# Patient Record
Sex: MALE | Race: WHITE | NOT HISPANIC OR LATINO | Employment: FULL TIME | URBAN - METROPOLITAN AREA
[De-identification: names, ages, dates, MRNs, and addresses within clinical notes are randomized per-mention and may not be internally consistent; named-entity substitution may affect disease eponyms.]

---

## 2017-02-07 ENCOUNTER — OFFICE VISIT - HEALTHEAST (OUTPATIENT)
Dept: FAMILY MEDICINE | Facility: CLINIC | Age: 27
End: 2017-02-07

## 2017-02-07 DIAGNOSIS — R21 RASH OF HANDS: ICD-10-CM

## 2017-02-07 DIAGNOSIS — L29.9 ITCHING OF EAR: ICD-10-CM

## 2017-08-15 ENCOUNTER — RECORDS - HEALTHEAST (OUTPATIENT)
Dept: LAB | Facility: HOSPITAL | Age: 27
End: 2017-08-15

## 2017-08-16 LAB — HBV SURFACE AB SERPL IA-ACNC: POSITIVE M[IU]/ML

## 2019-11-04 ENCOUNTER — COMMUNICATION - HEALTHEAST (OUTPATIENT)
Dept: SCHEDULING | Facility: CLINIC | Age: 29
End: 2019-11-04

## 2019-11-05 ENCOUNTER — COMMUNICATION - HEALTHEAST (OUTPATIENT)
Dept: SCHEDULING | Facility: CLINIC | Age: 29
End: 2019-11-05

## 2019-11-05 ENCOUNTER — OFFICE VISIT - HEALTHEAST (OUTPATIENT)
Dept: FAMILY MEDICINE | Facility: CLINIC | Age: 29
End: 2019-11-05

## 2019-11-05 DIAGNOSIS — R50.9 FEVER AND CHILLS: ICD-10-CM

## 2019-11-05 DIAGNOSIS — J02.9 SORE THROAT: ICD-10-CM

## 2019-11-05 DIAGNOSIS — J06.9 URI WITH COUGH AND CONGESTION: ICD-10-CM

## 2019-11-05 LAB — DEPRECATED S PYO AG THROAT QL EIA: NORMAL

## 2019-11-05 ASSESSMENT — MIFFLIN-ST. JEOR: SCORE: 1655.18

## 2019-11-06 LAB — GROUP A STREP BY PCR: NORMAL

## 2019-11-08 ENCOUNTER — OFFICE VISIT - HEALTHEAST (OUTPATIENT)
Dept: FAMILY MEDICINE | Facility: CLINIC | Age: 29
End: 2019-11-08

## 2019-11-08 DIAGNOSIS — R05.9 COUGH: ICD-10-CM

## 2019-11-08 DIAGNOSIS — R50.9 FEVER AND CHILLS: ICD-10-CM

## 2019-11-08 LAB
ALBUMIN SERPL-MCNC: 3.6 G/DL (ref 3.5–5)
ALBUMIN UR-MCNC: NEGATIVE MG/DL
ALP SERPL-CCNC: 101 U/L (ref 45–120)
ALT SERPL W P-5'-P-CCNC: 103 U/L (ref 0–45)
ANION GAP SERPL CALCULATED.3IONS-SCNC: 11 MMOL/L (ref 5–18)
APPEARANCE UR: CLEAR
AST SERPL W P-5'-P-CCNC: 83 U/L (ref 0–40)
BASOPHILS # BLD AUTO: 0 THOU/UL (ref 0–0.2)
BASOPHILS NFR BLD AUTO: 0 % (ref 0–2)
BILIRUB SERPL-MCNC: 0.6 MG/DL (ref 0–1)
BILIRUB UR QL STRIP: NEGATIVE
BUN SERPL-MCNC: 7 MG/DL (ref 8–22)
C REACTIVE PROTEIN LHE: 5.8 MG/DL (ref 0–0.8)
CALCIUM SERPL-MCNC: 8.7 MG/DL (ref 8.5–10.5)
CHLORIDE BLD-SCNC: 99 MMOL/L (ref 98–107)
CO2 SERPL-SCNC: 25 MMOL/L (ref 22–31)
COLOR UR AUTO: YELLOW
CREAT SERPL-MCNC: 0.86 MG/DL (ref 0.7–1.3)
EOSINOPHIL COUNT (ABSOLUTE): 0 THOU/UL (ref 0–0.4)
EOSINOPHIL NFR BLD AUTO: 1 % (ref 0–6)
ERYTHROCYTE [DISTWIDTH] IN BLOOD BY AUTOMATED COUNT: 11.9 % (ref 11–14.5)
ERYTHROCYTE [SEDIMENTATION RATE] IN BLOOD BY WESTERGREN METHOD: 12 MM/HR (ref 0–15)
GFR SERPL CREATININE-BSD FRML MDRD: >60 ML/MIN/1.73M2
GLUCOSE BLD-MCNC: 88 MG/DL (ref 70–125)
GLUCOSE UR STRIP-MCNC: NEGATIVE MG/DL
HCT VFR BLD AUTO: 38.6 % (ref 40–54)
HGB BLD-MCNC: 13.2 G/DL (ref 14–18)
HGB UR QL STRIP: NEGATIVE
KETONES UR STRIP-MCNC: NEGATIVE MG/DL
LEUKOCYTE ESTERASE UR QL STRIP: NEGATIVE
LYMPHOCYTES # BLD AUTO: 1.2 THOU/UL (ref 0.8–4.4)
LYMPHOCYTES NFR BLD AUTO: 25 % (ref 20–40)
MCH RBC QN AUTO: 28 PG (ref 27–34)
MCHC RBC AUTO-ENTMCNC: 34.2 G/DL (ref 32–36)
MCV RBC AUTO: 82 FL (ref 80–100)
MONOCYTES # BLD AUTO: 0.4 THOU/UL (ref 0–0.9)
MONOCYTES NFR BLD AUTO: 8 % (ref 2–10)
MONOCYTES NFR BLD AUTO: NEGATIVE %
NITRATE UR QL: NEGATIVE
OVALOCYTES: ABNORMAL
PH UR STRIP: 6 [PH] (ref 5–8)
PLAT MORPH BLD: NORMAL
PLATELET # BLD AUTO: 168 THOU/UL (ref 140–440)
PMV BLD AUTO: 12.3 FL (ref 8.5–12.5)
POTASSIUM BLD-SCNC: 4.1 MMOL/L (ref 3.5–5)
PROT SERPL-MCNC: 7 G/DL (ref 6–8)
RBC # BLD AUTO: 4.71 MILL/UL (ref 4.4–6.2)
REACTIVE LYMPHS: ABNORMAL
SODIUM SERPL-SCNC: 135 MMOL/L (ref 136–145)
SP GR UR STRIP: <=1.005 (ref 1–1.03)
TOTAL NEUTROPHILS-ABS(DIFF): 3.1 THOU/UL (ref 2–7.7)
TOTAL NEUTROPHILS-REL(DIFF): 66 % (ref 50–70)
UROBILINOGEN UR STRIP-ACNC: NORMAL
WBC: 4.7 THOU/UL (ref 4–11)

## 2019-11-08 ASSESSMENT — MIFFLIN-ST. JEOR: SCORE: 1648.1

## 2019-11-09 ENCOUNTER — COMMUNICATION - HEALTHEAST (OUTPATIENT)
Dept: SCHEDULING | Facility: CLINIC | Age: 29
End: 2019-11-09

## 2019-11-13 ENCOUNTER — OFFICE VISIT - HEALTHEAST (OUTPATIENT)
Dept: FAMILY MEDICINE | Facility: CLINIC | Age: 29
End: 2019-11-13

## 2019-11-13 DIAGNOSIS — J18.9 PNEUMONIA OF LEFT LOWER LOBE DUE TO INFECTIOUS ORGANISM: ICD-10-CM

## 2019-11-13 LAB
AEROBIC BLOOD CULTURE BOTTLE: NO GROWTH
ANAEROBIC BLOOD CULTURE BOTTLE: NO GROWTH

## 2019-11-13 ASSESSMENT — MIFFLIN-ST. JEOR: SCORE: 1658.87

## 2020-07-06 ENCOUNTER — OFFICE VISIT - HEALTHEAST (OUTPATIENT)
Dept: FAMILY MEDICINE | Facility: CLINIC | Age: 30
End: 2020-07-06

## 2020-07-06 DIAGNOSIS — Z13.228 SCREENING FOR ENDOCRINE, NUTRITIONAL, METABOLIC AND IMMUNITY DISORDER: ICD-10-CM

## 2020-07-06 DIAGNOSIS — Z13.0 SCREENING FOR ENDOCRINE, NUTRITIONAL, METABOLIC AND IMMUNITY DISORDER: ICD-10-CM

## 2020-07-06 DIAGNOSIS — Z13.29 SCREENING FOR ENDOCRINE, NUTRITIONAL, METABOLIC AND IMMUNITY DISORDER: ICD-10-CM

## 2020-07-06 DIAGNOSIS — Z00.00 VISIT FOR PREVENTIVE HEALTH EXAMINATION: ICD-10-CM

## 2020-07-06 DIAGNOSIS — Z13.21 SCREENING FOR ENDOCRINE, NUTRITIONAL, METABOLIC AND IMMUNITY DISORDER: ICD-10-CM

## 2020-07-06 LAB
ALBUMIN SERPL-MCNC: 4.1 G/DL (ref 3.5–5)
ALP SERPL-CCNC: 82 U/L (ref 45–120)
ALT SERPL W P-5'-P-CCNC: 20 U/L (ref 0–45)
ANION GAP SERPL CALCULATED.3IONS-SCNC: 12 MMOL/L (ref 5–18)
AST SERPL W P-5'-P-CCNC: 23 U/L (ref 0–40)
BILIRUB SERPL-MCNC: 0.5 MG/DL (ref 0–1)
BUN SERPL-MCNC: 18 MG/DL (ref 8–22)
CALCIUM SERPL-MCNC: 9.6 MG/DL (ref 8.5–10.5)
CHLORIDE BLD-SCNC: 107 MMOL/L (ref 98–107)
CHOLEST SERPL-MCNC: 192 MG/DL
CO2 SERPL-SCNC: 23 MMOL/L (ref 22–31)
CREAT SERPL-MCNC: 1.02 MG/DL (ref 0.7–1.3)
FASTING STATUS PATIENT QL REPORTED: NO
GFR SERPL CREATININE-BSD FRML MDRD: >60 ML/MIN/1.73M2
GLUCOSE BLD-MCNC: 90 MG/DL (ref 70–125)
HDLC SERPL-MCNC: 51 MG/DL
HIV 1+2 AB+HIV1 P24 AG SERPL QL IA: NEGATIVE
LDLC SERPL CALC-MCNC: 122 MG/DL
POTASSIUM BLD-SCNC: 4.9 MMOL/L (ref 3.5–5)
PROT SERPL-MCNC: 7.8 G/DL (ref 6–8)
SODIUM SERPL-SCNC: 142 MMOL/L (ref 136–145)
TRIGL SERPL-MCNC: 93 MG/DL
TSH SERPL DL<=0.005 MIU/L-ACNC: 1.77 UIU/ML (ref 0.3–5)

## 2020-07-06 ASSESSMENT — MIFFLIN-ST. JEOR: SCORE: 1670.21

## 2020-07-09 ENCOUNTER — COMMUNICATION - HEALTHEAST (OUTPATIENT)
Dept: FAMILY MEDICINE | Facility: CLINIC | Age: 30
End: 2020-07-09

## 2020-07-11 ENCOUNTER — COMMUNICATION - HEALTHEAST (OUTPATIENT)
Dept: FAMILY MEDICINE | Facility: CLINIC | Age: 30
End: 2020-07-11

## 2021-05-30 VITALS — BODY MASS INDEX: 27.35 KG/M2 | WEIGHT: 172 LBS

## 2021-06-03 VITALS
DIASTOLIC BLOOD PRESSURE: 66 MMHG | RESPIRATION RATE: 18 BRPM | HEART RATE: 80 BPM | HEIGHT: 67 IN | BODY MASS INDEX: 26.13 KG/M2 | OXYGEN SATURATION: 96 % | SYSTOLIC BLOOD PRESSURE: 108 MMHG | TEMPERATURE: 97.9 F | WEIGHT: 166.5 LBS

## 2021-06-03 VITALS
DIASTOLIC BLOOD PRESSURE: 56 MMHG | HEART RATE: 78 BPM | SYSTOLIC BLOOD PRESSURE: 104 MMHG | RESPIRATION RATE: 18 BRPM | HEIGHT: 67 IN | BODY MASS INDEX: 26.14 KG/M2 | TEMPERATURE: 98 F | OXYGEN SATURATION: 99 % | WEIGHT: 166.56 LBS

## 2021-06-03 VITALS
BODY MASS INDEX: 25.9 KG/M2 | RESPIRATION RATE: 16 BRPM | SYSTOLIC BLOOD PRESSURE: 102 MMHG | HEART RATE: 76 BPM | HEIGHT: 67 IN | DIASTOLIC BLOOD PRESSURE: 60 MMHG | WEIGHT: 165 LBS | TEMPERATURE: 98.2 F

## 2021-06-03 NOTE — PATIENT INSTRUCTIONS - HE
Upper respiratory infection with cough and congestion:    Rapid Strep A screen today was negative.    Continue symptomatic care measures with Tylenol as needed, fluids, and rest.    Follow up if fever is above 100 degrees.

## 2021-06-03 NOTE — PATIENT INSTRUCTIONS - HE
Pneumonia Right lower lobe: Finish doxycycline. A work note will be provided. Call back if fever persists.

## 2021-06-03 NOTE — TELEPHONE ENCOUNTER
RN Assessment/Reason for Call:   Okay to leave Detailed Message  Patient calling in, 11/8 lab results   liver enzymes AST 83  told to stop taking Tylenol ; was taking Ibuphrophen 2-200 mg.  2 weeks ago was ill with a fever.  C reactive protein 5.8  Suppose to work this weekend.  Wrote a note.    RN Action/Disposition:   on call for RLN Dr Coy paged ; called back; nothing concerning..  Call back if worse symptoms  Discussed home care measures.  Offered flu immunization when feeling better> 2 weeks..  Agrees to plan.     Nina Bell RN    [facility] Triage/med refill  11/9/2019  12:25 PM

## 2021-06-03 NOTE — PATIENT INSTRUCTIONS - HE
Fever:     Lab results are pending. Dr. Guevara will call with results.    UA unremarkable thus far.     Note given to be off of work to 11/12/2019.    Consider referral to Infectious Disease.     Continue ibuprofen and Tylenol for fever.    Rest and push fluids.     Cough:     Start Tessalon Perles.

## 2021-06-03 NOTE — PROGRESS NOTES
ASSESSMENT AND PLAN:  1. Fever and chills  Patient has episodic fever did do a rapid strep test today results are below  - Rapid Strep A Screen- Throat Swab  - Group A Strep, RNA Direct Detection, Throat    2. Sore throat    The strep test was negative.  He has a mild sore throat suggested gargles and symptomatic treatment  - Rapid Strep A Screen- Throat Swab  - Group A Strep, RNA Direct Detection, Throat    3. URI with cough and congestion    He has been ill for approximately 4 days he is a work note covering him from [employer] suggested he vigorously try to rest hydrate himself if he continues to have a temperature above 100 needs to be reevaluated          Orders Placed This Encounter   Procedures     Rapid Strep A Screen- Throat Swab     Group A Strep, RNA Direct Detection, Throat     Medications Discontinued During This Encounter   Medication Reason     acetic acid-hydrocortisone (VOSOL-HC) otic solution Therapy completed       No follow-ups on file.    CHIEF COMPLAINT:  Chief Complaint   Patient presents with     Fever       HISTORY OF PRESENT ILLNESS:  Paresh is a 29 y.o. male who presents to the clinic today for ED follow up. He was evaluated in [facility] ED last night for fever and cough that started three days ago. His fever reportedly went up to 104 degrees. There was also accompanying dry cough, nasal congestion, low back pain, and mild headache. At home, his wife has had the flu, and his 4-year-old son had pneumonia two weeks ago. Influenza screens were negative. Chest x-rays were clear. At this point, the patient is still having dry cough and nasal congestion. He last took ibuprofen around 11:30am this morning. His sore throat and low back pain have resolved, and Paresh has been able to eat and swallow fine. There is no pain with swallowing or chewing. He denies any known environmental allergies. The patient has not gone to work at all this past week, and has been trying to rest. There has been  "no burning abdominal pain or dysuria.     REVIEW OF SYSTEMS:   General: Some fatigue.   ENT: Nasal congestion. No sore throat, dysphagia, or pain with chewing.  Respiratory: Dry cough.   GI: No abdominal pain.   : No dysuria.  Musculoskeletal: No body aches.  All other systems are negative.    PFSH:  He is employed at the [employer]. He is  and has a 4-year-old son. Reviewed as below.     TOBACCO USE:  Social History     Tobacco Use   Smoking Status Former Smoker   Smokeless Tobacco Never Used       VITALS:  Vitals:    11/05/19 1641   BP: 104/56   Pulse: 78   Resp: 18   Temp: 98  F (36.7  C)   TempSrc: Oral   SpO2: 99%   Weight: 166 lb 9 oz (75.6 kg)   Height: 5' 6.75\" (1.695 m)     Wt Readings from Last 3 Encounters:   11/05/19 166 lb 9 oz (75.6 kg)   02/07/17 172 lb (78 kg)   02/24/15 157 lb (71.2 kg)     Body mass index is 26.28 kg/m .    PHYSICAL EXAM:  General: Alert, cooperative, no distress, appears stated age  Head: Normocephalic, without obvious abnormality, atraumatic  Eyes: PERRL, conjunctiva/cornea clear, EOM's intact  Ears: Normal TM's and external ear canals, both ears  Nose: Nares normal, septum midline, mucosa normal, no drainage or sinus tenderness  Throat: Lips, mucosa, and tongue normal; teeth and gums normal, posterior of pharyngeal wall is erythematous  Lungs: Clear to auscultation bilaterally, respirations unlabored  Heart: Regular rate and rhythm, S1 and S2 normal, no murmur, rub, or gallop  Neurologic:  A & O x 3.  No tremor, no focal findings. Normal gait.     DATA REVIEWED:  Additional History from Old Records Summarized (2): none  Decision to Obtain Records (1): Reviewed [facility] 11/04/2019 ED note regardinge evaluation for fever.  Radiology Tests Summarized or Ordered (1): 11/04/2019 chest x-rays were clear.   Labs Reviewed or Ordered (1): 11/04/2019 influenza A and B screens were negative. Rapid Strep A screen ordered and was negative.  Medicine Test " Summarized or Ordered (1): none  Independent Review of EKG or X-RAY(2 each): none    I, Gisselle Metcalf, am scribing for and in the presence of, Dr. Guevara.    I, Dr. Guevara, personally performed the services described in this documentation, as scribed by Gisselle Metcalf in my presence, and it is both accurate and complete.      MEDICATIONS:  No current outpatient medications on file.     No current facility-administered medications for this visit.        Total Data Points: 3    Please note that this clinical encounter uses voice recognition software, there may be typographical errors present

## 2021-06-03 NOTE — TELEPHONE ENCOUNTER
RN Triage:    Spoke with 29 yr old Paresh who c/o:    Fever developed 2 evenings ago.    Yesterday fever was up to 104.0.    Tylenol at 9:00 pm last night brought fever down to 100.5    Current fever of 100.4 this morning without Tylenol.    Pt c/o body aches.    Mild HA.  Denies stiff neck.    Denies other viral symptoms.    Pt would like to be seen.    PLAN:  Will consult with PCP regarding ability to work pt into office schedule today or other instructions due to very high fever yesterday.  Please advise.    Advised patient to drink extra fluids.  Advised patient to take Tylenol or Ibuprofen as directed for fever.  Advised to call back if fever is over 103.0 or lasts longer than 72 hours.  Pt voiced understanding.  Rupal Lopze RN   [facility] RN Triage      Reason for Disposition    Fever > 103 F (39.4 C)     Yesterday 104.0    Fever with no signs of serious infection or localizing symptoms    Protocols used: FEVER-A-[place]

## 2021-06-03 NOTE — TELEPHONE ENCOUNTER
RN Triage:     Patient is calling in stating that this weekend he was running a high fever. Went to the ED. NO flu. Patient took temp this morning and got a temp of 107 recheck after removing hoodies and blankets was 103. Patient using a temporal scanner. He was advised to unbundle and scan 3 times and take the average. At 11 am the thermometer read 104 and he was bundled up. Took off the layers and rechecked temp and it was 99 patient feels lightheaded and has low back pain. Urinating fine, last void was this morning at 8 am. Clear urine. NO rash no purple or blood colored spots or dots. NO difficulty breathing. No stiff neck.  Per Dr. Albarado's note patient should follow up after ED visit. Patient warm transferred to appointments and given home care suggestions per RN protocol. Patient was advised to increase fluids to help cool him from the inside.   Cherie Joel RN, BSN [facility] Triage Nurse    Reason for Disposition    Fever present > 3 days (72 hours)    Poor fluid intake probably causing dizziness    Protocols used: FEVER-A-OH, DIZZINESS-A-OH

## 2021-06-03 NOTE — PROGRESS NOTES
ASSESSMENT AND PLAN:  1. Pneumonia of left lower lobe due to infectious organism (H) after seeing me in clinic patient continued to have fever and chills and was seen in the emergency room.  There he had a repeat x-ray which showed an infiltrate in the left lower lobe.  He was diagnosed with pneumonia and has been treated with doxycycline he reports that he feels somewhat better he still developing a fever but is taking less ibuprofen his energy level has returned however his appetite still very poor I have given him a work note today he will finish the medication if he continues to have a fever chills beyond next week he needs to contact me.          No orders of the defined types were placed in this encounter.    There are no discontinued medications.    No follow-ups on file.    CHIEF COMPLAINT:  Chief Complaint   Patient presents with     Fever       HISTORY OF PRESENT ILLNESS:  Paresh is a 29 y.o. male who presents to the clinic today for a persistent fever. Paresh has been dealing with for a about a week. The fever began on 11/4/2019 and prompted Paresh to visit [facility] ED. There he noted to have a fever and cough for 2 days. He reported temperature readings of 104 degrees and used Tylenol for relief.  He also endorsed dry cough, nasal congestion and lower back pain. Of note, at the time of onset his wife was battling the flu and his four year old son was just getting over a diagnoses of pneumonia. The following day on 11/5 Paresh was seen by , and evaluated for strep and flu. Both results returned back negative. Three days letter he returned without a break in his fever. Further testing was done including a hemogram and monospot, which also returned negative. On 11/10/2019 Paresh presented to [facility] ED department as his fever had spiked. He was given another chest Xray as well as a 10 day course of doxycyline antibiotic. The new Xray showed evidence of a pneumonia with a new streaky  "infiltrate in the left base. Today wang states that he has been feeling well and has been compliant with medication. He does confirm having a slight fever this morning and took a tylenol at around 3PM. Duane states he has not had much of an appetite but will try to begin eating more. He also confirms is wife is doing better.        REVIEW OF SYSTEMS:   Admits to fever and loss of appetite.   All other 10 point review of systems are negative.    PFSH:  Wang needs a work note for the remainder of the week and half of next week.     TOBACCO USE:  Social History     Tobacco Use   Smoking Status Former Smoker   Smokeless Tobacco Never Used       VITALS:  Vitals:    11/13/19 1628   BP: 108/66   Pulse: 80   Resp: 18   Temp: 97.9  F (36.6  C)   TempSrc: Oral   SpO2: 96%   Weight: 166 lb 8 oz (75.5 kg)   Height: 5' 7\" (1.702 m)     Wt Readings from Last 3 Encounters:   11/13/19 166 lb 8 oz (75.5 kg)   11/10/19 165 lb (74.8 kg)   11/08/19 165 lb (74.8 kg)     Body mass index is 26.08 kg/m .      PHYSICAL EXAM:  General: Alert, cooperative, no distress, appears stated age  Head: Normocephalic, without obvious abnormality, atraumatic  Lungs: Clear to auscultation bilaterally, respirations unlabored  Chest wall: No tenderness or deformity  Heart: Regular rate and rhythm, S1 and S2 normal, no murmur, rub, or gallop  Neurologic:  A & O x 3.  No tremor, no focal findings.       DATA REVIEWED:  Additional History from Old Records Summarized (2): Reviewed 11/10/2019 [facility] [address] ED Encounter for Fever.   Decision to Obtain Records (1): None  Radiology Tests Summarized or Ordered (1): Reviewed Chest XR 11/10/2019  Labs Reviewed or Ordered (1): None  Medicine Test Summarized or Ordered (1): Lactate level normal, Monospot test unremarkable blood cultures pending CRP was elevated at 5.8  Independent Review of EKG or X-RAY(2 each): Reviewed Chest Xray: Infiltrate of left lower lobe.     The visit lasted a total of 5 minutes face to face " with the patient. Over 50% of the time was spent counseling and educating the patient about fever.    I, Kelby Santos, am scribing for and in the presence of, Dr. Guevara.    I, Dr. Guevara, personally performed the services described in this documentation, as scribed by Kelby Santos in my presence, and it is both accurate and complete.      MEDICATIONS:  Current Outpatient Medications   Medication Sig Dispense Refill     benzonatate (TESSALON) 200 MG capsule Take 1 capsule (200 mg total) by mouth 3 (three) times a day as needed for cough. 30 capsule 0     doxycycline (VIBRAMYCIN) 100 MG capsule Take 1 capsule (100 mg total) by mouth 2 (two) times a day for 10 days. 19 capsule 0     No current facility-administered medications for this visit.        Total Data Points: 5    Please note that this clinical encounter uses voice recognition software, there may be typographical errors present

## 2021-06-04 VITALS
SYSTOLIC BLOOD PRESSURE: 115 MMHG | HEIGHT: 67 IN | OXYGEN SATURATION: 97 % | DIASTOLIC BLOOD PRESSURE: 73 MMHG | WEIGHT: 169 LBS | HEART RATE: 58 BPM | BODY MASS INDEX: 26.53 KG/M2

## 2021-06-08 NOTE — PROGRESS NOTES
ASSESSMENT & PLAN:  1. Rash of hands  ANNEMARIE was positive, but rash does show some component of dyshidrotic eczema, will treat with a combo cream, reassess in about 6 weeks  - KOH Prep  - clotrimazole-betamethasone (LOTRISONE) cream; Apply topically 2 (two) times a day for 14 days.  Dispense: 30 g; Refill: 0    2. Itching of ear  Likely from dryness in the ear, with mild eczema, topical eardrops prescribed.  - acetic acid-hydrocortisone (VOSOL-HC) otic solution; Administer 3 drops into both ears 3 (three) times a day.  Dispense: 10 mL; Refill: 0  There are no Patient Instructions on file for this visit.    Orders Placed This Encounter   Procedures     ANNEMARIE Prep     Left 4th digit     There are no discontinued medications.    No Follow-up on file.    CHIEF COMPLAINT:  Chief Complaint   Patient presents with     Finger     pt states dryness in left ring finger since oct 2016       HISTORY OF PRESENT ILLNESS:  Paresh is a 26 y.o. male presenting to the clinic today complaining of dry left ring finger and left ear. The finger rash began in October 2016. He denies use of gloves or chemicals. He tried using lotions with no improvement. He does not wear a ring on that finger. The rash is spreading to the palm and the 3rd finger. The left ear is itchy and dry. The KOH test is positive for fungal infection.    REVIEW OF SYSTEMS:   All other systems are negative.    PFSH:  Tobacco Use:  History   Smoking Status     Former Smoker   Smokeless Tobacco     Never Used       VITALS:  Vitals:    02/07/17 1459   BP: 112/70   Patient Site: Right Arm   Patient Position: Sitting   Cuff Size: Adult Regular   Pulse: (!) 56   Resp: 16   Temp: 98.1  F (36.7  C)   TempSrc: Oral   SpO2: 97%   Weight: 172 lb (78 kg)     Wt Readings from Last 3 Encounters:   02/07/17 172 lb (78 kg)   02/24/15 157 lb (71.2 kg)     Body mass index is 27.35 kg/(m^2).    PHYSICAL EXAM:  General:  Patient alert, in no acute distress.  Ears:  Hearing grossly normal.   Normal appearance to ears.  Bilateral TM s, external canals normal. Dryness of the ear canals with dry cerumen bilaterally.  Resp:  Clear to auscultation without crackles, wheezes or distress.  Normal respiratory effort.   CV:  Regular rate and rhythm without murmurs, rubs or gallops.   Skin:  Dry, scaly lesion of left 4th digit spreading to the palm and 3rd digit.  Neuro:  CN II-XII intact.  Psychiatric:  Alert & oriented x 3    ADDITIONAL HISTORY SUMMARIZED (2): None.  DECISION TO OBTAIN EXTRA INFORMATION (1): None.   RADIOLOGY TESTS (1): None.  LABS (1): KOH test ordered and reviewed.  MEDICINE TESTS (1): None.  INDEPENDENT REVIEW (2 each): None.     The visit lasted a total of 12 minutes face to face with the patient. Over 50% of the time was spent counseling and educating the patient about fungal infection, medication.    IMagali, am scribing for and in the presence of, Dr. Albarado.    I, Dr. Albarado, personally performed the services described in this documentation, as scribed by Magali Bourgeois in my presence, and it is both accurate and complete.    Dragon dictation was used for this note.  Speech recognition errors are a possibility.    MEDICATIONS:  Current Outpatient Prescriptions   Medication Sig Dispense Refill     acetic acid-hydrocortisone (VOSOL-HC) otic solution Administer 3 drops into both ears 3 (three) times a day. 10 mL 0     clotrimazole-betamethasone (LOTRISONE) cream Apply topically 2 (two) times a day for 14 days. 30 g 0     No current facility-administered medications for this visit.        Total data points: 1

## 2021-06-09 NOTE — PROGRESS NOTES
Assessment:      Healthy male exam.        Plan:      1.  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  2. Patient Counseling:  --Nutrition: Stressed importance of moderation in sodium/caffeine intake, saturated fat and cholesterol, caloric balance, sufficient intake of fresh fruits, vegetables, fiber, calcium, iron.  --Discussed the issue of calcium supplement, and the daily use of baby aspirin.  --Exercise: Stressed the importance of regular exercise.   --Substance Abuse: Discussed cessation/primary prevention of tobacco, alcohol, or other drug use; driving or other dangerous activities under the influence; availability of treatment for abuse.    --Sexuality: Discussed sexually transmitted diseases, partner selection, use of condoms, avoidance of unintended pregnancy.  --Injury prevention: Discussed safety belts, safety helmets, smoke detector, smoking near bedding or upholstery.   --Dental health: Discussed importance of regular tooth brushing, flossing, and dental visits.  --Immunizations reviewed.  --Discussed timing and benefits of screening colonoscopy and alternative options.  --After hours service discussed with patient             -- I have had an Advance Directives discussion with the patient.  The following high BMI interventions were performed this visit: encouragement to exercise and weight loss from baseline weight    3. Discussed the patient's BMI with him.  The BMI is above average; BMI management plan is completed  4. Follow up as needed for acute illness     Subjective:      Paresh Collins is a 30 y.o. male who presents for an annual exam. The patient reports that there is not domestic violence in his life.      He does have some paperwork to be completed.  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    Healthy Habits:   Regular Exercise: Yes  Sunscreen Use: Yes  Healthy Diet: Yes  Dental Visits Regularly: Yes  Seat Belt: Yes  Sexually  active: Yes  Monthly Self Testicular Exams:  Yes  Hemoccults: N/A  Flex Sig: N/A  Colonoscopy: N/A  Lipid Profile: Yes  Glucose Screen: Yes  Prevention of Osteoporosis: Yes  Last Dexa: N/A  Guns at Home:  N/A      Immunization History   Administered Date(s) Administered     Influenza, inj, historic,unspecified 11/26/2003, 10/27/2007     Meningococcal MPSV4, SQ 11/28/2005     Tdap 11/28/2005, 07/10/2014     Immunization status: up to date and documented.  Vision Screening:both eyes  Hearing: PASS     Current Outpatient Medications   Medication Sig Dispense Refill     benzonatate (TESSALON) 200 MG capsule Take 1 capsule (200 mg total) by mouth 3 (three) times a day as needed for cough. 30 capsule 0     No current facility-administered medications for this visit.      No past medical history on file.  No past surgical history on file.  Patient has no known allergies.  No family history on file.  Social History     Socioeconomic History     Marital status: Single     Spouse name: Not on file     Number of children: Not on file     Years of education: Not on file     Highest education level: Not on file   Occupational History     Not on file   Social Needs     Financial resource strain: Not on file     Food insecurity     Worry: Not on file     Inability: Not on file     Transportation needs     Medical: Not on file     Non-medical: Not on file   Tobacco Use     Smoking status: Former Smoker     Smokeless tobacco: Never Used   Substance and Sexual Activity     Alcohol use: Not on file     Drug use: Not on file     Sexual activity: Not on file   Lifestyle     Physical activity     Days per week: Not on file     Minutes per session: Not on file     Stress: Not on file   Relationships     Social connections     Talks on phone: Not on file     Gets together: Not on file     Attends [employer] service: Not on file     Active member of club or organization: Not on file     Attends meetings of clubs or organizations: Not on file  "    Relationship status: Not on file     Intimate partner violence     Fear of current or ex partner: Not on file     Emotionally abused: Not on file     Physically abused: Not on file     Forced sexual activity: Not on file   Other Topics Concern     Not on file   Social History Narrative     Not on file     No specialty comments available.  Review of Systems  General:  Denies problem  Eyes: Denies problem  Ears/Nose/Throat: Denies problem  Cardiovascular: Denies problem  Respiratory:  Denies problem  Gastrointestinal:  Denies problem  Genitourinary: Denies problem  Musculoskeletal:  Denies problem  Skin: Denies problem  Neurologic: Denies problem  Psychiatric: Denies problem  Endocrine: Denies problem  Heme/Lymphatic: Denies problem   Allergic/Immunologic: Denies problem        Objective:     Vitals:    07/06/20 1127   BP: 115/73   Pulse: (!) 58   SpO2: 97%   Weight: 169 lb (76.7 kg)   Height: 5' 7\" (1.702 m)     Body mass index is 26.47 kg/m .    Physical  General Appearance: Alert, cooperative, no distress, appears stated age  Head: Normocephalic, without obvious abnormality, atraumatic  Eyes: PERRL, conjunctiva/corneas clear, EOM's intact  Ears: Normal TM's and external ear canals, both ears  Nose: Nares normal, septum midline,mucosa normal, no drainage  Throat: Lips, mucosa, and tongue normal; teeth and gums normal  Neck: Supple, symmetrical, trachea midline, no adenopathy;  thyroid: not enlarged, symmetric, no tenderness/mass/nodules; no carotid bruit or JVD  Back: Symmetric, no curvature, ROM normal, no CVA tenderness  Lungs: Clear to auscultation bilaterally, respirations unlabored  Heart: Regular rate and rhythm, S1 and S2 normal, no murmur, rub, or gallop,  Abdomen: Soft, non-tender, bowel sounds active all four quadrants,  no masses, no organomegaly  Genitourinary: Penis normal. Right and left testis are descended.No palpable masses.   Rectal: No visible external lesion  Musculoskeletal: Normal range of " motion. No joint swelling or deformity.   Extremities: Extremities normal, atraumatic, no cyanosis or edema  Skin: Skin color, texture, turgor normal, no rashes or lesions  Lymph nodes: Cervical, supraclavicular, and axillary nodes normal  Neurologic: He is alert. He has normal reflexes.   Psychiatric: He has a normal mood and affect.        Haroldo Albarado MD        There are no diagnoses linked to this encounter.

## 2021-06-18 NOTE — PATIENT INSTRUCTIONS - HE
Patient Instructions by Haroldo Albarado MD at 7/6/2020 11:20 AM     Author: Haroldo Albarado MD Service: -- Author Type: Physician    Filed: 7/7/2020  3:53 PM Encounter Date: 7/6/2020 Status: Signed    : Haroldo Albarado MD (Physician)       Patient Education     Prevention Guidelines, Men Ages 18 to 39  Screening tests and vaccines are an important part of managing your health. A screening test is done to find possible disorders or diseases in people who don't have any symptoms. The goal is to find a disease early so lifestyle changes can be made and you can be watched more closely to reduce the risk of disease, or to detect it early enough to treat it most effectively. Screening tests are not considered diagnostic, but are used to determine if more testing is needed. Health counseling is essential, too. Below are guidelines for these, for men ages 18 to 39. Talk with your healthcare provider to make sure youre up-to-date on what you need.  Screening Who needs it How often   Alcohol misuse All men in this age group At routine exams   Blood pressure All men in this age group Yearly checkup if your blood pressure is normal  Normal blood pressure is less than 120/80  If your blood pressure is higher than normal, follow the advice of your healthcare provider.     All men in this age group At routine exams   Diabetes mellitus, type 2 All men who have no symptoms but are overweight or obese and have 1 or more other risk factors for diabetes At least every 3 years (yearly if blood sugar has already started to rise)   Diabetes mellitus, type 2  All men with prediabetes Every year   Hepatitis C If at increased risk At routine exams   High cholesterol or triglycerides All men ages 35 and older, and younger men at high risk for coronary artery disease At least every 5 years   HIV All men At routine exams   Obesity All men in this age group At routine exams   Syphilis Men at increased risk for  infection - talk with your healthcare provider At routine exams   Tuberculosis Men at increased risk for infection - talk with your healthcare provider Check with your healthcare provider   Vision All men in this age group Every 5 to 10 years if no risk factors for eye disease   Vaccines Who needs it How often   Chickenpox (varicella) All men in this age group who have no record of this infection or vaccine 2 doses; the second dose should be given at least 4 weeks after the first dose   Hepatitis A Men at increased risk for infection - talk with your healthcare provider 2 doses given at least 6 months apart   Hepatitis B Men at increased risk for infection - talk with your healthcare provider 3 doses over 6 months; second dose should be given 1 month after the first dose; the third dose should be given at least 2 months after the second dose and at least 4 months after the first dose   Haemophilus influenzae Type B (HIB) Men at increased risk for infection - talk with your healthcare provider 1 to 3 doses   Human papillomavirus (HPV) All men in this age group up to age 26 3 doses; the second dose should be given 1 to 2 months after the first dose and the third dose given 6 months after the first dose   Influenza (flu) All men in this age group Once a year   Measles, mumps, rubella (MMR) All men in this age group who have no record of these infections or vaccines 1 or 2 doses through age 55   Meningococcal Men at increased risk for infection - talk with your healthcare provider 1 or more doses   Pneumococca (PCV13) and Pneumococcal (PPSV23) Men at increased risk for infection - talk with your healthcare provider PCV13: 1 dose ages 19 to 65 (protects against 13 types of pneumococcal bacteria)  PPSV23: 1 to 2 doses through age 64, or 1 dose at 65 or older (protects against 23 types of pneumococcal bacteria)   Tetanus/diphtheria/pertussis (Td/Tdap) booster All men in this age group A one-time Tdap booster after age 18,  then Td every10 years   Counseling Who needs it How often   Diet and exercise Overweight or obese people When diagnosed, and then at routine exams   Use of tobacco and the health effects it can cause All men in this age group Every visit   Sexually transmitted infection prevention Men who are sexually active At routine exams   Skin cancer Prevention of skin cancer in fair-skinned adults through age 24 At routine exams   1Those who are 18 years of age, who are not up-to-date on their childhood immunizations, should receive all appropriate catch-up vaccines recommended by the CDC.  Date Last Reviewed: 2/1/2017 2000-2019 [employer]. [address] [place] [address], [place] [address]. All rights reserved. This information is not intended as a substitute for professional medical care. Always follow your healthcare professional's instructions.

## 2021-06-19 NOTE — LETTER
Letter by Brian Guevara MD at      Author: Brian Guevara MD Service: -- Author Type: --    Filed:  Encounter Date: 11/13/2019 Status: Signed         November 13, 2019     Patient: Paresh Collins   YOB: 1990   Date of Visit: 11/13/2019       To Whom It May Concern:    It is my medical opinion that Paresh Collins should remain out of work until 11/18/2019.  Please excuse his absence from work.    If you have any questions or concerns, please don't hesitate to call.    Sincerely,        Electronically signed by Brian Guevara MD

## 2021-06-19 NOTE — LETTER
Letter by Brian Guevara MD at      Author: Brian Guevara MD Service: -- Author Type: --    Filed:  Encounter Date: 11/8/2019 Status: Signed         November 8, 2019     Patient: Paresh Collins   YOB: 1990   Date of Visit: 11/8/2019       To Whom it May Concern:    Paresh Collins was seen in my clinic on 11/8/2019.    If you have any questions or concerns, please don't hesitate to call.    Sincerely,         Electronically signed by Brian Guevara MD

## 2021-06-19 NOTE — LETTER
Letter by Brian Guevara MD at      Author: Brian Guevara MD Service: -- Author Type: --    Filed:  Encounter Date: 11/13/2019 Status: Signed         November 13, 2019     Patient: Paresh Collins   YOB: 1990   Date of Visit: 11/13/2019       To Whom it May Concern:    Paresh Collins was seen in my clinic on 11/13/2019.    If you have any questions or concerns, please don't hesitate to call.    Sincerely,         Electronically signed by Brian Guevara MD

## 2021-06-19 NOTE — LETTER
Letter by Brian Guevara MD at      Author: Brian Guevara MD Service: -- Author Type: --    Filed:  Encounter Date: 11/8/2019 Status: Signed         November 8, 2019     Patient: Paresh Collins   YOB: 1990   Date of Visit: 11/8/2019       To Whom It May Concern:    It is my medical opinion that Paresh Collins may return to work on November 11, 2019. Please excuse his absence from work on 11/8/2019 - 11/10/2019.    If you have any questions or concerns, please don't hesitate to call.    Sincerely,        Electronically signed by Brian Guevara MD

## 2021-06-26 ENCOUNTER — HEALTH MAINTENANCE LETTER (OUTPATIENT)
Age: 31
End: 2021-06-26

## 2021-08-21 ENCOUNTER — HEALTH MAINTENANCE LETTER (OUTPATIENT)
Age: 31
End: 2021-08-21

## 2022-04-28 ENCOUNTER — OFFICE VISIT (OUTPATIENT)
Dept: FAMILY MEDICINE | Facility: CLINIC | Age: 32
End: 2022-04-28
Payer: COMMERCIAL

## 2022-04-28 VITALS
HEIGHT: 66 IN | WEIGHT: 157.5 LBS | DIASTOLIC BLOOD PRESSURE: 75 MMHG | BODY MASS INDEX: 25.31 KG/M2 | OXYGEN SATURATION: 98 % | TEMPERATURE: 98 F | HEART RATE: 53 BPM | SYSTOLIC BLOOD PRESSURE: 127 MMHG

## 2022-04-28 DIAGNOSIS — Z00.00 VISIT FOR PREVENTIVE HEALTH EXAMINATION: Primary | ICD-10-CM

## 2022-04-28 PROCEDURE — 99395 PREV VISIT EST AGE 18-39: CPT | Performed by: FAMILY MEDICINE

## 2022-04-28 NOTE — PROGRESS NOTES
SUBJECTIVE:   CC: Paresh Collins is an 32 year old male who presents for preventative health visit.         Healthy Habits:     Getting at least 3 servings of Calcium per day:  Yes    Bi-annual eye exam:  NO    Dental care twice a year:  Yes    Sleep apnea or symptoms of sleep apnea:  None    Diet:  Regular (no restrictions)    Frequency of exercise:  2-3 days/week    Duration of exercise:  30-45 minutes    Taking medications regularly:  Not Applicable    Barriers to taking medications:  None    Medication side effects:  Not applicable    PHQ-2 Total Score: 0    Additional concerns today:  No      PROBLEMS TO ADD ON...  No complain, doing well  Today's PHQ-2 Score:   PHQ-2 ( 1999 Pfizer) 4/28/2022   Q1: Little interest or pleasure in doing things 0   Q2: Feeling down, depressed or hopeless 0   PHQ-2 Score 0   Q1: Little interest or pleasure in doing things Not at all   Q2: Feeling down, depressed or hopeless Not at all   PHQ-2 Score 0       Abuse: Current or Past(Physical, Sexual or Emotional)- No  Do you feel safe in your environment? Yes        Social History     Tobacco Use     Smoking status: Former Smoker     Smokeless tobacco: Never Used   Substance Use Topics     Alcohol use: Not on file     If you drink alcohol do you typically have >3 drinks per day or >7 drinks per week? No    Alcohol Use 4/28/2022   Prescreen: >3 drinks/day or >7 drinks/week? No   Prescreen: >3 drinks/day or >7 drinks/week? -   No flowsheet data found.    Last PSA: No results found for: PSA    Reviewed orders with patient. Reviewed health maintenance and updated orders accordingly - Yes  Lab work is in process  Labs reviewed in EPIC  BP Readings from Last 3 Encounters:   04/28/22 127/75   06/21/21 132/70   07/06/20 115/73    Wt Readings from Last 3 Encounters:   04/28/22 71.4 kg (157 lb 8 oz)   06/21/21 77.1 kg (170 lb)   07/06/20 76.7 kg (169 lb)                  There is no problem list on file for this patient.    No past surgical  "history on file.    Social History     Tobacco Use     Smoking status: Former Smoker     Smokeless tobacco: Never Used   Substance Use Topics     Alcohol use: Not on file     No family history on file.      No current outpatient medications on file.     No Known Allergies  Recent Labs   Lab Test 07/06/20  1213 11/10/19  1200 11/08/19  1325     --   --    HDL 51  --   --    TRIG 93  --   --    ALT 20 103* 103*   CR 1.02 0.87 0.86   GFRESTIMATED >60 >60 >60   GFRESTBLACK >60 >60 >60   POTASSIUM 4.9 3.5 4.1   TSH 1.77  --   --         Reviewed and updated as needed this visit by clinical staff   Tobacco  Allergies  Meds                Reviewed and updated as needed this visit by Provider                     No past medical history on file.   No past surgical history on file.  OB History   No obstetric history on file.       Review of Systems  CONSTITUTIONAL: NEGATIVE for fever, chills, change in weight  INTEGUMENTARY/SKIN: NEGATIVE for worrisome rashes, moles or lesions  EYES: NEGATIVE for vision changes or irritation  ENT: NEGATIVE for ear, mouth and throat problems  RESP: NEGATIVE for significant cough or SOB  CV: NEGATIVE for chest pain, palpitations or peripheral edema  GI: NEGATIVE for nausea, abdominal pain, heartburn, or change in bowel habits   male: negative for dysuria, hematuria, decreased urinary stream, erectile dysfunction, urethral discharge  MUSCULOSKELETAL: NEGATIVE for significant arthralgias or myalgia  NEURO: NEGATIVE for weakness, dizziness or paresthesias  PSYCHIATRIC: NEGATIVE for changes in mood or affect    OBJECTIVE:   /75 (BP Location: Left arm, Patient Position: Sitting, Cuff Size: Adult Regular)   Pulse 53   Temp 98  F (36.7  C)   Ht 1.685 m (5' 6.34\")   Wt 71.4 kg (157 lb 8 oz)   SpO2 98%   BMI 25.16 kg/m      Physical Exam  GENERAL: healthy, alert and no distress  EYES: Eyes grossly normal to inspection, PERRL and conjunctivae and sclerae normal  HENT: ear canals " "and TM's normal, nose and mouth without ulcers or lesions  NECK: no adenopathy, no asymmetry, masses, or scars and thyroid normal to palpation  RESP: lungs clear to auscultation - no rales, rhonchi or wheezes  CV: regular rate and rhythm, normal S1 S2, no S3 or S4, no murmur, click or rub, no peripheral edema and peripheral pulses strong  ABDOMEN: soft, nontender, no hepatosplenomegaly, no masses and bowel sounds normal  MS: no gross musculoskeletal defects noted, no edema  SKIN: no suspicious lesions or rashes  NEURO: Normal strength and tone, mentation intact and speech normal  PSYCH: mentation appears normal, affect normal/bright    Diagnostic Test Results:  Labs reviewed in Epic    ASSESSMENT/PLAN:   (Z00.00) Visit for preventive health examination  (primary encounter diagnosis)  Comment:   Plan:    Overall doing fine, lab work results from a couple of years ago were reviewed within normal range, no need to repeat today, we will plan on rechecking next year, sooner if there is any new concern.    Patient has been advised of split billing requirements and indicates understanding: Yes    COUNSELING:   Reviewed preventive health counseling, as reflected in patient instructions       Regular exercise       Healthy diet/nutrition       Vision screening       Hearing screening    Estimated body mass index is 25.16 kg/m  as calculated from the following:    Height as of this encounter: 1.685 m (5' 6.34\").    Weight as of this encounter: 71.4 kg (157 lb 8 oz).     Weight management plan: Discussed healthy diet and exercise guidelines    He reports that he has quit smoking. He has never used smokeless tobacco.      Counseling Resources:  ATP IV Guidelines  Pooled Cohorts Equation Calculator  FRAX Risk Assessment  ICSI Preventive Guidelines  Dietary Guidelines for Americans, 2010  USDA's MyPlate  ASA Prophylaxis  Lung CA Screening    Haroldo Albarado MD  [facility]  "

## 2023-04-20 ENCOUNTER — PATIENT OUTREACH (OUTPATIENT)
Dept: CARE COORDINATION | Facility: CLINIC | Age: 33
End: 2023-04-20
Payer: COMMERCIAL

## 2023-06-04 ENCOUNTER — HEALTH MAINTENANCE LETTER (OUTPATIENT)
Age: 33
End: 2023-06-04

## 2024-04-03 ENCOUNTER — OFFICE VISIT (OUTPATIENT)
Dept: FAMILY MEDICINE | Facility: CLINIC | Age: 34
End: 2024-04-03
Payer: COMMERCIAL

## 2024-04-03 VITALS
TEMPERATURE: 97.3 F | RESPIRATION RATE: 16 BRPM | HEART RATE: 52 BPM | HEIGHT: 67 IN | OXYGEN SATURATION: 99 % | DIASTOLIC BLOOD PRESSURE: 78 MMHG | WEIGHT: 162 LBS | SYSTOLIC BLOOD PRESSURE: 132 MMHG | BODY MASS INDEX: 25.43 KG/M2

## 2024-04-03 DIAGNOSIS — Z13.21 SCREENING FOR ENDOCRINE, NUTRITIONAL, METABOLIC AND IMMUNITY DISORDER: ICD-10-CM

## 2024-04-03 DIAGNOSIS — Z13.228 SCREENING FOR ENDOCRINE, NUTRITIONAL, METABOLIC AND IMMUNITY DISORDER: ICD-10-CM

## 2024-04-03 DIAGNOSIS — Z13.0 SCREENING FOR ENDOCRINE, NUTRITIONAL, METABOLIC AND IMMUNITY DISORDER: ICD-10-CM

## 2024-04-03 DIAGNOSIS — Z13.29 SCREENING FOR ENDOCRINE, NUTRITIONAL, METABOLIC AND IMMUNITY DISORDER: ICD-10-CM

## 2024-04-03 DIAGNOSIS — Z00.00 ROUTINE GENERAL MEDICAL EXAMINATION AT A HEALTH CARE FACILITY: Primary | ICD-10-CM

## 2024-04-03 DIAGNOSIS — D17.30 LIPOMA OF SKIN AND SUBCUTANEOUS TISSUE: ICD-10-CM

## 2024-04-03 PROCEDURE — 99395 PREV VISIT EST AGE 18-39: CPT | Performed by: FAMILY MEDICINE

## 2024-04-03 PROCEDURE — 80053 COMPREHEN METABOLIC PANEL: CPT | Performed by: FAMILY MEDICINE

## 2024-04-03 PROCEDURE — 91320 SARSCV2 VAC 30MCG TRS-SUC IM: CPT | Performed by: FAMILY MEDICINE

## 2024-04-03 PROCEDURE — 80061 LIPID PANEL: CPT | Performed by: FAMILY MEDICINE

## 2024-04-03 PROCEDURE — 90480 ADMN SARSCOV2 VAC 1/ONLY CMP: CPT | Performed by: FAMILY MEDICINE

## 2024-04-03 PROCEDURE — 36415 COLL VENOUS BLD VENIPUNCTURE: CPT | Performed by: FAMILY MEDICINE

## 2024-04-03 PROCEDURE — 84443 ASSAY THYROID STIM HORMONE: CPT | Performed by: FAMILY MEDICINE

## 2024-04-03 SDOH — HEALTH STABILITY: PHYSICAL HEALTH: ON AVERAGE, HOW MANY DAYS PER WEEK DO YOU ENGAGE IN MODERATE TO STRENUOUS EXERCISE (LIKE A BRISK WALK)?: 3 DAYS

## 2024-04-03 ASSESSMENT — SOCIAL DETERMINANTS OF HEALTH (SDOH): HOW OFTEN DO YOU GET TOGETHER WITH FRIENDS OR RELATIVES?: ONCE A WEEK

## 2024-04-03 NOTE — PROGRESS NOTES
"Preventive Care Visit  [facility]  Haroldo Albarado MD, Family Medicine  Apr 3, 2024      Assessment & Plan     Routine general medical examination at a health care facility      Screening for endocrine, nutritional, metabolic and immunity disorder    - Glucose; Future  - Comprehensive metabolic panel; Future  - Lipid panel reflex to direct LDL Fasting; Future  - TSH with free T4 reflex; Future  - Glucose  - Comprehensive metabolic panel  - Lipid panel reflex to direct LDL Fasting  - TSH with free T4 reflex    Lipoma of skin and subcutaneous tissue left forearm  Appears small, discussed management, patient preferred continued observation and he will let us know if there is any new change.    Patient has been advised of split billing requirements and indicates understanding: Yes  Review of external notes as documented elsewhere in note  40 minutes spent by me on the date of the encounter doing chart review, review of outside records, review of test results, interpretation of tests, patient visit, and documentation       BMI  Estimated body mass index is 25.37 kg/m  as calculated from the following:    Height as of this encounter: 1.702 m (5' 7\").    Weight as of this encounter: 73.5 kg (162 lb).   Weight management plan: Discussed healthy diet and exercise guidelines    Counseling  Appropriate preventive services were discussed with this patient, including applicable screening as appropriate for fall prevention, nutrition, physical activity, Tobacco-use cessation, weight loss and cognition.  Checklist reviewing preventive services available has been given to the patient.  Reviewed patient's diet, addressing concerns and/or questions.   He is at risk for lack of exercise and has been provided with information to increase physical activity for the benefit of his well-being.   He is at risk for psychosocial distress and has been provided with information to reduce risk.       Regular " exercise    Subjective   Duane is a 33 year old, presenting for the following:  Physical        4/3/2024     5:05 PM   Additional Questions   Roomed by hser   Accompanied by self        Health Care Directive  Patient does not have a Health Care Directive or Living Will: Discussed advance care planning with patient; information given to patient to review.    HPI    Small mass, about 1 cm  left upper forearm area nontender.  Has been there for quite some times, not getting bigger.      4/3/2024   General Health   How would you rate your overall physical health? Excellent   Feel stress (tense, anxious, or unable to sleep) Only a little   (!) STRESS CONCERN      4/3/2024   Nutrition   Three or more servings of calcium each day? Yes   Diet: Regular (no restrictions)   How many servings of fruit and vegetables per day? (!) 2-3   How many sweetened beverages each day? 0-1         4/3/2024   Exercise   Days per week of moderate/strenous exercise 3 days         4/3/2024   Social Factors   Frequency of gathering with friends or relatives Once a week   Worry food won't last until get money to buy more No   Food not last or not have enough money for food? No   Do you have housing?  Yes   Are you worried about losing your housing? No   Lack of transportation? No   Unable to get utilities (heat,electricity)? No         4/3/2024   Dental   Dentist two times every year? Yes         4/3/2024   TB Screening   Were you born outside of the [place]? No         Today's PHQ-2 Score:       4/3/2024     5:07 PM   PHQ-2 ( 1999 Pfizer)   Q1: Little interest or pleasure in doing things 0   Q2: Feeling down, depressed or hopeless 0   PHQ-2 Score 0   Q1: Little interest or pleasure in doing things Not at all   Q2: Feeling down, depressed or hopeless Not at all   PHQ-2 Score 0           4/3/2024   Substance Use   Alcohol more than 3/day or more than 7/wk No   Do you use any other substances recreationally? (!) ALCOHOL     Social History     Tobacco Use  "   Smoking status: Former    Smokeless tobacco: Never   Vaping Use    Vaping Use: Never used           4/3/2024   STI Screening   New sexual partner(s) since last STI/HIV test? No         4/3/2024   Contraception/Family Planning   Questions about contraception or family planning No        Reviewed and updated as needed this visit by Provider                    No past medical history on file.  No past surgical history on file.  BP Readings from Last 3 Encounters:   04/03/24 132/78   04/28/22 127/75   06/21/21 132/70    Wt Readings from Last 3 Encounters:   04/03/24 73.5 kg (162 lb)   04/28/22 71.4 kg (157 lb 8 oz)   06/21/21 77.1 kg (170 lb)                  Recent Labs   Lab Test 07/06/20  1213 11/10/19  1200 11/08/19  1325     --   --    HDL 51  --   --    TRIG 93  --   --    ALT 20 103* 103*   CR 1.02 0.87 0.86   GFRESTIMATED >60 >60 >60   GFRESTBLACK >60 >60 >60   POTASSIUM 4.9 3.5 4.1   TSH 1.77  --   --           Review of Systems  Constitutional, HEENT, cardiovascular, pulmonary, gi and gu systems are negative, except as otherwise noted.     Objective    Exam  /78 (BP Location: Left arm, Patient Position: Sitting, Cuff Size: Adult Regular)   Pulse 52   Temp 97.3  F (36.3  C) (Temporal)   Resp 16   Ht 1.702 m (5' 7\")   Wt 73.5 kg (162 lb)   SpO2 99%   BMI 25.37 kg/m     Estimated body mass index is 25.37 kg/m  as calculated from the following:    Height as of this encounter: 1.702 m (5' 7\").    Weight as of this encounter: 73.5 kg (162 lb).    Physical Exam  GENERAL: alert and no distress  EYES: Eyes grossly normal to inspection, PERRL and conjunctivae and sclerae normal  HENT: ear canals and TM's normal, nose and mouth without ulcers or lesions  NECK: no adenopathy, no asymmetry, masses, or scars  RESP: lungs clear to auscultation - no rales, rhonchi or wheezes  CV: regular rate and rhythm, normal S1 S2, no S3 or S4, no murmur, click or rub, no peripheral edema  ABDOMEN: soft, nontender, " no hepatosplenomegaly, no masses and bowel sounds normal  MS: no gross musculoskeletal defects noted, no edema  SKIN: Small 1 cm soft easily mobile lipoma type of mass left forearm area, nontender, remainder of skin exam otherwise negative for significant skin disease.  NEURO: Normal strength and tone, mentation intact and speech normal  PSYCH: mentation appears normal, affect normal/bright        Signed Electronically by: Haroldo Albarado MD        Prior to immunization administration, verified patients identity using patient s name and date of birth. Please see Immunization Activity for additional information.     Screening Questionnaire for Adult Immunization    Are you sick today?   No   Do you have allergies to medications, food, a vaccine component or latex?   No   Have you ever had a serious reaction after receiving a vaccination?   No   Do you have a long-term health problem with heart, lung, kidney, or metabolic disease (e.g., diabetes), asthma, a blood disorder, no spleen, complement component deficiency, a cochlear implant, or a spinal fluid leak?  Are you on long-term aspirin therapy?   No   Do you have cancer, leukemia, HIV/AIDS, or any other immune system problem?   No   Do you have a parent, brother, or sister with an immune system problem?   No   In the past 3 months, have you taken medications that affect  your immune system, such as prednisone, other steroids, or anticancer drugs; drugs for the treatment of rheumatoid arthritis, Crohn s disease, or psoriasis; or have you had radiation treatments?   No   Have you had a seizure, or a brain or other nervous system problem?   No   During the past year, have you received a transfusion of blood or blood    products, or been given immune (gamma) globulin or antiviral drug?   No   For women: Are you pregnant or is there a chance you could become       pregnant during the next month?   No   Have you received any vaccinations in the past 4 weeks?   No      Immunization questionnaire answers were all negative.      Patient instructed to remain in clinic for 15 minutes afterwards, and to report any adverse reactions.     Screening performed by Lloyd Jovel MA on 4/3/2024 at 5:11 PM.

## 2024-04-03 NOTE — PATIENT INSTRUCTIONS
Preventive Care Advice   This is general advice given by our system to help you stay healthy. However, your care team may have specific advice just for you. Please talk to your care team about your preventive care needs.  Nutrition  Eat 5 or more servings of fruits and vegetables each day.  Try wheat bread, brown rice and whole grain pasta (instead of white bread, rice, and pasta).  Get enough calcium and vitamin D. Check the label on foods and aim for 100% of the RDA (recommended daily allowance).  Lifestyle  Exercise at least 150 minutes each week   (30 minutes a day, 5 days a week).  Do muscle strengthening activities 2 days a week. These help control your weight and prevent disease.  No smoking.  Wear sunscreen to prevent skin cancer.  Have a dental exam and cleaning every 6 months.  Yearly exams  See your health care team every year to talk about:  Any changes in your health.  Any medicines your care team has prescribed.  Preventive care, family planning, and ways to prevent chronic diseases.  Shots (vaccines)   HPV shots (up to age 26), if you've never had them before.  Hepatitis B shots (up to age 59), if you've never had them before.  COVID-19 shot: Get this shot when it's due.  Flu shot: Get a flu shot every year.  Tetanus shot: Get a tetanus shot every 10 years.  Pneumococcal, hepatitis A, and RSV shots: Ask your care team if you need these based on your risk.  Shingles shot (for age 50 and up).  General health tests  Diabetes screening:  Starting at age 35, Get screened for diabetes at least every 3 years.  If you are younger than age 35, ask your care team if you should be screened for diabetes.  Cholesterol test: At age 39, start having a cholesterol test every 5 years, or more often if advised.  Bone density scan (DEXA): At age 50, ask your care team if you should have this scan for osteoporosis (brittle bones).  Hepatitis C: Get tested at least once in your life.  STIs (sexually transmitted  infections)  Before age 24: Ask your care team if you should be screened for STIs.  After age 24: Get screened for STIs if you're at risk. You are at risk for STIs (including HIV) if:  You are sexually active with more than one person.  You don't use condoms every time.  You or a partner was diagnosed with a sexually transmitted infection.  If you are at risk for HIV, ask about PrEP medicine to prevent HIV.  Get tested for HIV at least once in your life, whether you are at risk for HIV or not.  Cancer screening tests  Cervical cancer screening: If you have a cervix, begin getting regular cervical cancer screening tests at age 21. Most people who have regular screenings with normal results can stop after age 65. Talk about this with your provider.  Breast cancer scan (mammogram): If you've ever had breasts, begin having regular mammograms starting at age 40. This is a scan to check for breast cancer.  Colon cancer screening: It is important to start screening for colon cancer at age 45.  Have a colonoscopy test every 10 years (or more often if you're at risk) Or, ask your provider about stool tests like a FIT test every year or Cologuard test every 3 years.  To learn more about your testing options, visit: https://www."[employer]"/674670.pdf.  For help making a decision, visit: https://bit.ly/mf65991.  Prostate cancer screening test: If you have a prostate and are age 55 to 69, ask your provider if you would benefit from a yearly prostate cancer screening test.  Lung cancer screening: If you are a current or former smoker age 50 to 80, ask your care team if ongoing lung cancer screenings are right for you.  For informational purposes only. Not to replace the advice of your health care provider. Copyright   2023 [employer]. All rights reserved. Clinically reviewed by the [facility] Transitions Program. [employer] 483901 - REV 01/24.    Learning About Stress  What is stress?     Stress is your  body's response to a hard situation. Your body can have a physical, emotional, or mental response. Stress is a fact of life for most people, and it affects everyone differently. What causes stress for you may not be stressful for someone else.  A lot of things can cause stress. You may feel stress when you go on a job interview, take a test, or run a race. This kind of short-term stress is normal and even useful. It can help you if you need to work hard or react quickly. For example, stress can help you finish an important job on time.  Long-term stress is caused by ongoing stressful situations or events. Examples of long-term stress include long-term health problems, ongoing problems at work, or conflicts in your family. Long-term stress can harm your health.  How does stress affect your health?  When you are stressed, your body responds as though you are in danger. It makes hormones that speed up your heart, make you breathe faster, and give you a burst of energy. This is called the fight-or-flight stress response. If the stress is over quickly, your body goes back to normal and no harm is done.  But if stress happens too often or lasts too long, it can have bad effects. Long-term stress can make you more likely to get sick, and it can make symptoms of some diseases worse. If you tense up when you are stressed, you may develop neck, shoulder, or low back pain. Stress is linked to high blood pressure and heart disease.  Stress also harms your emotional health. It can make you drummond, tense, or depressed. Your relationships may suffer, and you may not do well at work or school.  What can you do to manage stress?  You can try these things to help manage stress:   Do something active. Exercise or activity can help reduce stress. Walking is a great way to get started. Even everyday activities such as housecleaning or yard work can help.  Try yoga or rika chi. These techniques combine exercise and meditation. You may need  some training at first to learn them.  Do something you enjoy. For example, listen to music or go to a movie. Practice your hobby or do volunteer work.  Meditate. This can help you relax, because you are not worrying about what happened before or what may happen in the future.  Do guided imagery. Imagine yourself in any setting that helps you feel calm. You can use online videos, books, or a teacher to guide you.  Do breathing exercises. For example:  From a standing position, bend forward from the waist with your knees slightly bent. Let your arms dangle close to the floor.  Breathe in slowly and deeply as you return to a standing position. Roll up slowly and lift your head last.  Hold your breath for just a few seconds in the standing position.  Breathe out slowly and bend forward from the waist.  Let your feelings out. Talk, laugh, cry, and express anger when you need to. Talking with supportive friends or family, a counselor, or a phyllis leader about your feelings is a healthy way to relieve stress. Avoid discussing your feelings with people who make you feel worse.  Write. It may help to write about things that are bothering you. This helps you find out how much stress you feel and what is causing it. When you know this, you can find better ways to cope.  What can you do to prevent stress?  You might try some of these things to help prevent stress:  Manage your time. This helps you find time to do the things you want and need to do.  Get enough sleep. Your body recovers from the stresses of the day while you are sleeping.  Get support. Your family, friends, and community can make a difference in how you experience stress.  Limit your news feed. Avoid or limit time on social media or news that may make you feel stressed.  Do something active. Exercise or activity can help reduce stress. Walking is a great way to get started.  Where can you learn more?  Go to https://www.healthwise.net/patiented  Enter N032 in the  "search box to learn more about \"Learning About Stress.\"  Current as of: October 24, 2023               Content Version: 14.0    0264-2682 [employer].   Care instructions adapted under license by your healthcare professional. If you have questions about a medical condition or this instruction, always ask your healthcare professional. [employer] disclaims any warranty or liability for your use of this information.      Substance Use Disorder: Care Instructions  Overview     You can improve your life and health by stopping your use of alcohol or drugs. When you don't drink or use drugs, you may feel and sleep better. You may get along better with your family, friends, and coworkers. There are medicines and programs that can help with substance use disorder.  How can you care for yourself at home?  Here are some ways to help you stay sober and prevent relapse.  If you have been given medicine to help keep you sober or reduce your cravings, be sure to take it exactly as prescribed.  Talk to your doctor about programs that can help you stop using drugs or drinking alcohol.  Do not keep alcohol or drugs in your home.  Plan ahead. Think about what you'll say if other people ask you to drink or use drugs. Try not to spend time with people who drink or use drugs.  Use the time and money spent on drinking or drugs to do something that's important to you.  Preventing a relapse  Have a plan to deal with relapse. Learn to recognize changes in your thinking that lead you to drink or use drugs. Get help before you start to drink or use drugs again.  Try to stay away from situations, friends, or places that may lead you to drink or use drugs.  If you feel the need to drink alcohol or use drugs again, seek help right away. Call a trusted friend or family member. Some people get support from organizations such as Narcotics Anonymous or [employer] or from treatment facilities.  If you relapse, get help " as soon as you can. Some people make a plan with another person that outlines what they want that person to do for them if they relapse. The plan usually includes how to handle the relapse and who to notify in case of relapse.  Don't give up. Remember that a relapse doesn't mean that you have failed. Use the experience to learn the triggers that lead you to drink or use drugs. Then quit again. Recovery is a lifelong process. Many people have several relapses before they are able to quit for good.  Follow-up care is a key part of your treatment and safety. Be sure to make and go to all appointments, and call your doctor if you are having problems. It's also a good idea to know your test results and keep a list of the medicines you take.  When should you call for help?   Call 911  anytime you think you may need emergency care. For example, call if you or someone else:    Has overdosed or has withdrawal signs. Be sure to tell the emergency workers that you are or someone else is using or trying to quit using drugs. Overdose or withdrawal signs may include:  Losing consciousness.  Seizure.  Seeing or hearing things that aren't there (hallucinations).     Is thinking or talking about suicide or harming others.   Where to get help 24 hours a day, 7 days a week   If you or someone you know talks about suicide, self-harm, a mental health crisis, a substance use crisis, or any other kind of emotional distress, get help right away. You can:    Call the Suicide and Crisis Lifeline at 98.     Call 4-734-856-TALK (1-355.467.1140).     Text HOME to 809341 to access the Crisis Text Line.   Consider saving these numbers in your phone.  Go to [employer] for more information or to chat online.  Call your doctor now or seek immediate medical care if:    You are having withdrawal symptoms. These may include nausea or vomiting, sweating, shakiness, and anxiety.   Watch closely for changes in your health, and be sure to contact  "your doctor if:    You have a relapse.     You need more help or support to stop.   Where can you learn more?  Go to https://www.health[employer]utanix.net/patiented  Enter H573 in the search box to learn more about \"Substance Use Disorder: Care Instructions.\"  Current as of: November 15, 2023               Content Version: 14.0    4495-7182 [employer].   Care instructions adapted under license by your healthcare professional. If you have questions about a medical condition or this instruction, always ask your healthcare professional. Healthwise, [employer] disclaims any warranty or liability for your use of this information.      "

## 2024-04-04 LAB
ALBUMIN SERPL BCG-MCNC: 4.5 G/DL (ref 3.5–5.2)
ALP SERPL-CCNC: 79 U/L (ref 40–150)
ALT SERPL W P-5'-P-CCNC: 13 U/L (ref 0–70)
ANION GAP SERPL CALCULATED.3IONS-SCNC: 10 MMOL/L (ref 7–15)
AST SERPL W P-5'-P-CCNC: 21 U/L (ref 0–45)
BILIRUB SERPL-MCNC: 0.4 MG/DL
BUN SERPL-MCNC: 21.4 MG/DL (ref 6–20)
CALCIUM SERPL-MCNC: 9.5 MG/DL (ref 8.6–10)
CHLORIDE SERPL-SCNC: 104 MMOL/L (ref 98–107)
CHOLEST SERPL-MCNC: 181 MG/DL
CREAT SERPL-MCNC: 0.97 MG/DL (ref 0.67–1.17)
DEPRECATED HCO3 PLAS-SCNC: 25 MMOL/L (ref 22–29)
EGFRCR SERPLBLD CKD-EPI 2021: >90 ML/MIN/1.73M2
FASTING STATUS PATIENT QL REPORTED: ABNORMAL
FASTING STATUS PATIENT QL REPORTED: NORMAL
GLUCOSE SERPL-MCNC: 88 MG/DL (ref 70–99)
GLUCOSE SERPL-MCNC: 88 MG/DL (ref 70–99)
HDLC SERPL-MCNC: 55 MG/DL
LDLC SERPL CALC-MCNC: 108 MG/DL
NONHDLC SERPL-MCNC: 126 MG/DL
POTASSIUM SERPL-SCNC: 4.4 MMOL/L (ref 3.4–5.3)
PROT SERPL-MCNC: 7.5 G/DL (ref 6.4–8.3)
SODIUM SERPL-SCNC: 139 MMOL/L (ref 135–145)
TRIGL SERPL-MCNC: 92 MG/DL
TSH SERPL DL<=0.005 MIU/L-ACNC: 3.27 UIU/ML (ref 0.3–4.2)

## 2025-03-04 ENCOUNTER — PATIENT OUTREACH (OUTPATIENT)
Dept: CARE COORDINATION | Facility: CLINIC | Age: 35
End: 2025-03-04
Payer: COMMERCIAL

## 2025-03-18 ENCOUNTER — PATIENT OUTREACH (OUTPATIENT)
Dept: CARE COORDINATION | Facility: CLINIC | Age: 35
End: 2025-03-18
Payer: COMMERCIAL

## 2025-04-09 ENCOUNTER — PATIENT OUTREACH (OUTPATIENT)
Dept: CARE COORDINATION | Facility: CLINIC | Age: 35
End: 2025-04-09
Payer: COMMERCIAL

## 2025-06-02 ENCOUNTER — OFFICE VISIT (OUTPATIENT)
Dept: FAMILY MEDICINE | Facility: CLINIC | Age: 35
End: 2025-06-02
Payer: COMMERCIAL

## 2025-06-02 VITALS
RESPIRATION RATE: 20 BRPM | HEIGHT: 66 IN | HEART RATE: 57 BPM | TEMPERATURE: 97.8 F | SYSTOLIC BLOOD PRESSURE: 127 MMHG | DIASTOLIC BLOOD PRESSURE: 73 MMHG | OXYGEN SATURATION: 97 % | BODY MASS INDEX: 26.52 KG/M2 | WEIGHT: 165 LBS

## 2025-06-02 DIAGNOSIS — Z00.00 ROUTINE GENERAL MEDICAL EXAMINATION AT A HEALTH CARE FACILITY: Primary | ICD-10-CM

## 2025-06-02 PROCEDURE — 90715 TDAP VACCINE 7 YRS/> IM: CPT | Performed by: FAMILY MEDICINE

## 2025-06-02 PROCEDURE — 99395 PREV VISIT EST AGE 18-39: CPT | Mod: 25 | Performed by: FAMILY MEDICINE

## 2025-06-02 PROCEDURE — 90471 IMMUNIZATION ADMIN: CPT | Performed by: FAMILY MEDICINE

## 2025-06-02 PROCEDURE — 3074F SYST BP LT 130 MM HG: CPT | Performed by: FAMILY MEDICINE

## 2025-06-02 PROCEDURE — 3078F DIAST BP <80 MM HG: CPT | Performed by: FAMILY MEDICINE

## 2025-06-02 SDOH — HEALTH STABILITY: PHYSICAL HEALTH: ON AVERAGE, HOW MANY DAYS PER WEEK DO YOU ENGAGE IN MODERATE TO STRENUOUS EXERCISE (LIKE A BRISK WALK)?: 4 DAYS

## 2025-06-02 SDOH — HEALTH STABILITY: PHYSICAL HEALTH: ON AVERAGE, HOW MANY MINUTES DO YOU ENGAGE IN EXERCISE AT THIS LEVEL?: 60 MIN

## 2025-06-02 ASSESSMENT — SOCIAL DETERMINANTS OF HEALTH (SDOH): HOW OFTEN DO YOU GET TOGETHER WITH FRIENDS OR RELATIVES?: ONCE A WEEK

## 2025-06-02 NOTE — PATIENT INSTRUCTIONS
Patient Education   Preventive Care Advice   This is general advice given by our system to help you stay healthy. However, your care team may have specific advice just for you. Please talk to your care team about your preventive care needs.  Nutrition  Eat 5 or more servings of fruits and vegetables each day.  Try wheat bread, brown rice and whole grain pasta (instead of white bread, rice, and pasta).  Get enough calcium and vitamin D. Check the label on foods and aim for 100% of the RDA (recommended daily allowance).  Lifestyle  Exercise at least 150 minutes each week  (30 minutes a day, 5 days a week).  Do muscle strengthening activities 2 days a week. These help control your weight and prevent disease.  No smoking.  Wear sunscreen to prevent skin cancer.  Have a dental exam and cleaning every 6 months.  Yearly exams  See your health care team every year to talk about:  Any changes in your health.  Any medicines your care team has prescribed.  Preventive care, family planning, and ways to prevent chronic diseases.  Shots (vaccines)   HPV shots (up to age 26), if you've never had them before.  Hepatitis B shots (up to age 59), if you've never had them before.  COVID-19 shot: Get this shot when it's due.  Flu shot: Get a flu shot every year.  Tetanus shot: Get a tetanus shot every 10 years.  Pneumococcal, hepatitis A, and RSV shots: Ask your care team if you need these based on your risk.  Shingles shot (for age 50 and up)  General health tests  Diabetes screening:  Starting at age 35, Get screened for diabetes at least every 3 years.  If you are younger than age 35, ask your care team if you should be screened for diabetes.  Cholesterol test: At age 39, start having a cholesterol test every 5 years, or more often if advised.  Bone density scan (DEXA): At age 50, ask your care team if you should have this scan for osteoporosis (brittle bones).  Hepatitis C: Get tested at least once in your life.  STIs (sexually  transmitted infections)  Before age 24: Ask your care team if you should be screened for STIs.  After age 24: Get screened for STIs if you're at risk. You are at risk for STIs (including HIV) if:  You are sexually active with more than one person.  You don't use condoms every time.  You or a partner was diagnosed with a sexually transmitted infection.  If you are at risk for HIV, ask about PrEP medicine to prevent HIV.  Get tested for HIV at least once in your life, whether you are at risk for HIV or not.  Cancer screening tests  Cervical cancer screening: If you have a cervix, begin getting regular cervical cancer screening tests starting at age 21.  Breast cancer scan (mammogram): If you've ever had breasts, begin having regular mammograms starting at age 40. This is a scan to check for breast cancer.  Colon cancer screening: It is important to start screening for colon cancer at age 45.  Have a colonoscopy test every 10 years (or more often if you're at risk) Or, ask your provider about stool tests like a FIT test every year or Cologuard test every 3 years.  To learn more about your testing options, visit:   .  For help making a decision, visit:   https://bit.ly/eq64950.  Prostate cancer screening test: If you have a prostate, ask your care team if a prostate cancer screening test (PSA) at age 55 is right for you.  Lung cancer screening: If you are a current or former smoker ages 50 to 80, ask your care team if ongoing lung cancer screenings are right for you.  For informational purposes only. Not to replace the advice of your health care provider. Copyright   2023 [employer]. All rights reserved. Clinically reviewed by the [facility] Transitions Program. [employer] 126894 - REV 01/24.

## 2025-06-02 NOTE — PROGRESS NOTES
"Preventive Care Visit  [facility]  Haroldo Albarado MD, Family Medicine  Jun 2, 2025      Assessment & Plan     Routine general medical examination at a health care facility      Patient has been advised of split billing requirements and indicates understanding: Yes        BMI  Estimated body mass index is 26.42 kg/m  as calculated from the following:    Height as of this encounter: 1.683 m (5' 6.26\").    Weight as of this encounter: 74.8 kg (165 lb).   Weight management plan: Discussed healthy diet and exercise guidelines    Counseling  Appropriate preventive services were addressed with this patient via screening, questionnaire, or discussion as appropriate for fall prevention, nutrition, physical activity, Tobacco-use cessation, social engagement, weight loss and cognition.  Checklist reviewing preventive services available has been given to the patient.  Reviewed patient's diet, addressing concerns and/or questions.       Follow-up   No follow-ups on file.     Follow-up Visit   Expected date:  Jun 02, 2026 (Approximate)      Follow Up Appointment Details:     Follow-up with whom?: PCP    Follow-Up for what?: Adult Preventive    How?: In Person                 Subjective   Duane is a 35 year old, presenting for the following:  Physical        6/2/2025     1:32 PM   Additional Questions   Roomed by Andi ROCHE   Accompanied by self          HPI  No specific complaint today, usually stays active by running, biking etc.    Advance Care Planning    Discussed advance care planning with patient; informed AVS has link to [employer].        6/2/2025   General Health   How would you rate your overall physical health? Excellent   Feel stress (tense, anxious, or unable to sleep) Not at all         6/2/2025   Nutrition   Three or more servings of calcium each day? Yes   Diet: Regular (no restrictions)   How many servings of fruit and vegetables per day? (!) 2-3   How many sweetened beverages each day? " 0-1         6/2/2025   Exercise   Days per week of moderate/strenous exercise 4 days   Average minutes spent exercising at this level 60 min         6/2/2025   Social Factors   Frequency of gathering with friends or relatives Once a week   Worry food won't last until get money to buy more No   Food not last or not have enough money for food? No   Do you have housing? (Housing is defined as stable permanent housing and does not include staying outside in a car, in a tent, in an abandoned building, in an overnight shelter, or couch-surfing.) Yes   Are you worried about losing your housing? No   Lack of transportation? No   Unable to get utilities (heat,electricity)? No         6/2/2025   Dental   Dentist two times every year? Yes         Today's PHQ-2 Score:       6/2/2025     1:19 PM   PHQ-2 ( 1999 [employer])   Q1: Little interest or pleasure in doing things 0   Q2: Feeling down, depressed or hopeless 0   PHQ-2 Score 0    Q1: Little interest or pleasure in doing things Not at all   Q2: Feeling down, depressed or hopeless Not at all   PHQ-2 Score 0       Patient-reported           6/2/2025   Substance Use   Alcohol more than 3/day or more than 7/wk No   Do you use any other substances recreationally? No     Social History     Tobacco Use    Smoking status: Former    Smokeless tobacco: Never   Vaping Use    Vaping status: Never Used           6/2/2025   STI Screening   New sexual partner(s) since last STI/HIV test? No         6/2/2025   Contraception/Family Planning   Questions about contraception or family planning No        Reviewed and updated as needed this visit by Provider                    There is no problem list on file for this patient.    No past surgical history on file.    Social History     Tobacco Use    Smoking status: Former    Smokeless tobacco: Never   Substance Use Topics    Alcohol use: Not on file     No family history on file.          Review of Systems  Constitutional, HEENT, cardiovascular,  "pulmonary, GI, , musculoskeletal, neuro, skin, endocrine and psych systems are negative, except as otherwise noted.     Objective    Exam  /73 (BP Location: Left arm, Patient Position: Sitting, Cuff Size: Adult Regular)   Pulse 57   Temp 97.8  F (36.6  C) (Oral)   Resp 20   Ht 1.683 m (5' 6.26\")   Wt 74.8 kg (165 lb)   SpO2 97%   BMI 26.42 kg/m     Estimated body mass index is 26.42 kg/m  as calculated from the following:    Height as of this encounter: 1.683 m (5' 6.26\").    Weight as of this encounter: 74.8 kg (165 lb).    Physical Exam  GENERAL: alert and no distress  EYES: Eyes grossly normal to inspection, PERRL and conjunctivae and sclerae normal  HENT: ear canals and TM's normal, nose and mouth without ulcers or lesions  NECK: no adenopathy, no asymmetry, masses, or scars  RESP: lungs clear to auscultation - no rales, rhonchi or wheezes  CV: regular rate and rhythm, normal S1 S2, no S3 or S4, no murmur, click or rub, no peripheral edema  ABDOMEN: soft, nontender, no hepatosplenomegaly, no masses and bowel sounds normal  MS: no gross musculoskeletal defects noted, no edema  SKIN: no suspicious lesions or rashes  NEURO: Normal strength and tone, mentation intact and speech normal  PSYCH: mentation appears normal, affect normal/bright        Signed Electronically by: Haroldo Albarado MD    "

## 2025-06-02 NOTE — PROGRESS NOTES
Prior to immunization administration, verified patients identity using patient s name and date of birth. Please see Immunization Activity for additional information.     Screening Questionnaire for Adult Immunization    Are you sick today?   No   Do you have allergies to medications, food, a vaccine component or latex?   No   Have you ever had a serious reaction after receiving a vaccination?   No   Do you have a long-term health problem with heart, lung, kidney, or metabolic disease (e.g., diabetes), asthma, a blood disorder, no spleen, complement component deficiency, a cochlear implant, or a spinal fluid leak?  Are you on long-term aspirin therapy?   No   Do you have cancer, leukemia, HIV/AIDS, or any other immune system problem?   No   Do you have a parent, brother, or sister with an immune system problem?   No   In the past 3 months, have you taken medications that affect  your immune system, such as prednisone, other steroids, or anticancer drugs; drugs for the treatment of rheumatoid arthritis, Crohn s disease, or psoriasis; or have you had radiation treatments?   No   Have you had a seizure, or a brain or other nervous system problem?   No   During the past year, have you received a transfusion of blood or blood    products, or been given immune (gamma) globulin or antiviral drug?   No   For women: Are you pregnant or is there a chance you could become       pregnant during the next month?   No   Have you received any vaccinations in the past 4 weeks?   No     Immunization questionnaire answers were all negative.      Patient instructed to remain in clinic for 15 minutes afterwards, and to report any adverse reactions.     Screening performed by Andi Gallagher MA on 6/2/2025 at 1:34 PM.